# Patient Record
Sex: MALE
[De-identification: names, ages, dates, MRNs, and addresses within clinical notes are randomized per-mention and may not be internally consistent; named-entity substitution may affect disease eponyms.]

---

## 2017-10-17 PROBLEM — Z00.00 ENCOUNTER FOR PREVENTIVE HEALTH EXAMINATION: Status: ACTIVE | Noted: 2017-10-17

## 2017-11-15 ENCOUNTER — APPOINTMENT (OUTPATIENT)
Dept: PULMONOLOGY | Facility: CLINIC | Age: 62
End: 2017-11-15
Payer: MEDICARE

## 2017-11-15 VITALS
HEIGHT: 68 IN | DIASTOLIC BLOOD PRESSURE: 70 MMHG | HEART RATE: 84 BPM | BODY MASS INDEX: 25.91 KG/M2 | SYSTOLIC BLOOD PRESSURE: 110 MMHG | OXYGEN SATURATION: 97 % | WEIGHT: 171 LBS

## 2017-11-15 DIAGNOSIS — Z86.59 PERSONAL HISTORY OF OTHER MENTAL AND BEHAVIORAL DISORDERS: ICD-10-CM

## 2017-11-15 DIAGNOSIS — Z00.00 ENCOUNTER FOR GENERAL ADULT MEDICAL EXAMINATION W/OUT ABNORMAL FINDINGS: ICD-10-CM

## 2017-11-15 DIAGNOSIS — Z87.828 PERSONAL HISTORY OF OTHER (HEALED) PHYSICAL INJURY AND TRAUMA: ICD-10-CM

## 2017-11-15 DIAGNOSIS — Z87.19 PERSONAL HISTORY OF OTHER DISEASES OF THE DIGESTIVE SYSTEM: ICD-10-CM

## 2017-11-15 PROCEDURE — 94010 BREATHING CAPACITY TEST: CPT

## 2017-11-15 PROCEDURE — 99204 OFFICE O/P NEW MOD 45 MIN: CPT | Mod: 25

## 2017-11-15 RX ORDER — FAMOTIDINE 20 MG/1
20 TABLET, FILM COATED ORAL
Refills: 0 | Status: ACTIVE | COMMUNITY

## 2017-11-15 RX ORDER — ACETAMINOPHEN 325 MG/1
325 TABLET ORAL
Refills: 0 | Status: ACTIVE | COMMUNITY

## 2017-11-20 ENCOUNTER — FORM ENCOUNTER (OUTPATIENT)
Age: 62
End: 2017-11-20

## 2017-11-21 ENCOUNTER — APPOINTMENT (OUTPATIENT)
Dept: CT IMAGING | Facility: CLINIC | Age: 62
End: 2017-11-21
Payer: MEDICARE

## 2017-11-21 ENCOUNTER — OUTPATIENT (OUTPATIENT)
Dept: OUTPATIENT SERVICES | Facility: HOSPITAL | Age: 62
LOS: 1 days | End: 2017-11-21
Payer: MEDICARE

## 2017-11-21 DIAGNOSIS — F17.200 NICOTINE DEPENDENCE, UNSPECIFIED, UNCOMPLICATED: ICD-10-CM

## 2017-11-21 PROCEDURE — G0297: CPT

## 2017-11-21 PROCEDURE — G0297: CPT | Mod: 26

## 2017-12-06 ENCOUNTER — APPOINTMENT (OUTPATIENT)
Dept: PULMONOLOGY | Facility: CLINIC | Age: 62
End: 2017-12-06
Payer: MEDICARE

## 2017-12-06 VITALS
OXYGEN SATURATION: 97 % | SYSTOLIC BLOOD PRESSURE: 118 MMHG | RESPIRATION RATE: 16 BRPM | BODY MASS INDEX: 26.15 KG/M2 | DIASTOLIC BLOOD PRESSURE: 72 MMHG | WEIGHT: 172 LBS | HEART RATE: 82 BPM

## 2017-12-06 PROCEDURE — 99214 OFFICE O/P EST MOD 30 MIN: CPT

## 2017-12-21 ENCOUNTER — APPOINTMENT (OUTPATIENT)
Dept: PULMONOLOGY | Facility: CLINIC | Age: 62
End: 2017-12-21
Payer: MEDICARE

## 2017-12-22 ENCOUNTER — APPOINTMENT (OUTPATIENT)
Dept: PULMONOLOGY | Facility: CLINIC | Age: 62
End: 2017-12-22
Payer: MEDICARE

## 2017-12-22 VITALS
WEIGHT: 169 LBS | SYSTOLIC BLOOD PRESSURE: 130 MMHG | DIASTOLIC BLOOD PRESSURE: 70 MMHG | TEMPERATURE: 98.7 F | HEART RATE: 88 BPM | RESPIRATION RATE: 14 BRPM | BODY MASS INDEX: 25.61 KG/M2 | HEIGHT: 68 IN | OXYGEN SATURATION: 97 %

## 2017-12-22 VITALS — BODY MASS INDEX: 25.7 KG/M2 | WEIGHT: 169 LBS

## 2017-12-22 PROCEDURE — 94010 BREATHING CAPACITY TEST: CPT

## 2017-12-22 PROCEDURE — 99214 OFFICE O/P EST MOD 30 MIN: CPT | Mod: 25

## 2017-12-22 RX ORDER — GABAPENTIN 600 MG/1
600 TABLET, COATED ORAL
Refills: 0 | Status: ACTIVE | COMMUNITY

## 2017-12-22 RX ORDER — RISPERIDONE 4 MG/1
4 TABLET, FILM COATED ORAL
Refills: 0 | Status: ACTIVE | COMMUNITY

## 2018-05-18 ENCOUNTER — APPOINTMENT (OUTPATIENT)
Dept: PULMONOLOGY | Facility: CLINIC | Age: 63
End: 2018-05-18
Payer: MEDICARE

## 2018-05-18 VITALS — BODY MASS INDEX: 26 KG/M2 | WEIGHT: 171 LBS

## 2018-05-18 VITALS
SYSTOLIC BLOOD PRESSURE: 110 MMHG | BODY MASS INDEX: 26 KG/M2 | OXYGEN SATURATION: 97 % | HEIGHT: 68 IN | DIASTOLIC BLOOD PRESSURE: 70 MMHG | HEART RATE: 73 BPM

## 2018-05-18 DIAGNOSIS — Z01.811 ENCOUNTER FOR PREPROCEDURAL RESPIRATORY EXAMINATION: ICD-10-CM

## 2018-05-18 PROCEDURE — 94727 GAS DIL/WSHOT DETER LNG VOL: CPT

## 2018-05-18 PROCEDURE — 85018 HEMOGLOBIN: CPT | Mod: QW

## 2018-05-18 PROCEDURE — 99215 OFFICE O/P EST HI 40 MIN: CPT | Mod: 25

## 2018-05-18 PROCEDURE — 94729 DIFFUSING CAPACITY: CPT

## 2018-05-18 PROCEDURE — 94010 BREATHING CAPACITY TEST: CPT

## 2018-05-18 RX ORDER — PREDNISONE 10 MG/1
10 TABLET ORAL
Qty: 21 | Refills: 0 | Status: DISCONTINUED | COMMUNITY
Start: 2017-12-06 | End: 2018-05-18

## 2018-05-29 ENCOUNTER — MEDICATION RENEWAL (OUTPATIENT)
Age: 63
End: 2018-05-29

## 2018-06-06 ENCOUNTER — APPOINTMENT (OUTPATIENT)
Dept: PULMONOLOGY | Facility: CLINIC | Age: 63
End: 2018-06-06
Payer: MEDICARE

## 2018-06-06 VITALS
HEIGHT: 68 IN | DIASTOLIC BLOOD PRESSURE: 70 MMHG | SYSTOLIC BLOOD PRESSURE: 110 MMHG | WEIGHT: 172 LBS | OXYGEN SATURATION: 98 % | BODY MASS INDEX: 26.07 KG/M2 | HEART RATE: 76 BPM

## 2018-06-06 PROCEDURE — 99214 OFFICE O/P EST MOD 30 MIN: CPT

## 2018-09-12 ENCOUNTER — APPOINTMENT (OUTPATIENT)
Dept: PULMONOLOGY | Facility: CLINIC | Age: 63
End: 2018-09-12

## 2018-09-12 RX ORDER — RISPERIDONE 4 MG/1
4 TABLET, FILM COATED ORAL
Refills: 0 | Status: DISCONTINUED | COMMUNITY
End: 2018-09-12

## 2018-09-17 ENCOUNTER — MED ADMIN CHARGE (OUTPATIENT)
Age: 63
End: 2018-09-17

## 2018-09-17 ENCOUNTER — APPOINTMENT (OUTPATIENT)
Dept: PULMONOLOGY | Facility: CLINIC | Age: 63
End: 2018-09-17
Payer: MEDICARE

## 2018-09-17 VITALS
OXYGEN SATURATION: 98 % | WEIGHT: 172 LBS | BODY MASS INDEX: 26.15 KG/M2 | HEART RATE: 69 BPM | SYSTOLIC BLOOD PRESSURE: 110 MMHG | DIASTOLIC BLOOD PRESSURE: 66 MMHG

## 2018-09-17 DIAGNOSIS — Z85.05 PERSONAL HISTORY OF MALIGNANT NEOPLASM OF LIVER: ICD-10-CM

## 2018-09-17 DIAGNOSIS — Z23 ENCOUNTER FOR IMMUNIZATION: ICD-10-CM

## 2018-09-17 PROCEDURE — 99214 OFFICE O/P EST MOD 30 MIN: CPT | Mod: 25

## 2018-09-17 PROCEDURE — G0008: CPT

## 2018-09-17 PROCEDURE — 90688 IIV4 VACCINE SPLT 0.5 ML IM: CPT

## 2018-09-17 RX ORDER — PALIPERIDONE PALMITATE 234 MG/1.5ML
234 INJECTION INTRAMUSCULAR
Refills: 0 | Status: DISCONTINUED | COMMUNITY
End: 2018-09-17

## 2018-09-17 RX ORDER — GABAPENTIN 600 MG/1
600 TABLET, FILM COATED ORAL
Refills: 0 | Status: DISCONTINUED | COMMUNITY
End: 2018-09-17

## 2018-09-17 RX ORDER — FLUTICASONE PROPIONATE AND SALMETEROL XINAFOATE 115; 21 UG/1; UG/1
115-21 AEROSOL, METERED RESPIRATORY (INHALATION)
Qty: 3 | Refills: 3 | Status: DISCONTINUED | COMMUNITY
End: 2018-09-17

## 2019-01-22 ENCOUNTER — APPOINTMENT (OUTPATIENT)
Dept: PULMONOLOGY | Facility: CLINIC | Age: 64
End: 2019-01-22
Payer: MEDICARE

## 2019-01-22 VITALS
SYSTOLIC BLOOD PRESSURE: 118 MMHG | OXYGEN SATURATION: 98 % | BODY MASS INDEX: 27.83 KG/M2 | DIASTOLIC BLOOD PRESSURE: 78 MMHG | HEART RATE: 62 BPM | WEIGHT: 183 LBS | RESPIRATION RATE: 14 BRPM

## 2019-01-22 DIAGNOSIS — R59.0 LOCALIZED ENLARGED LYMPH NODES: ICD-10-CM

## 2019-01-22 DIAGNOSIS — R06.2 WHEEZING: ICD-10-CM

## 2019-01-22 PROCEDURE — 99214 OFFICE O/P EST MOD 30 MIN: CPT

## 2019-01-22 NOTE — CONSULT LETTER
[Dear  ___] : Dear  [unfilled], [Courtesy Letter:] : I had the pleasure of seeing your patient, [unfilled], in my office today. [Consult Closing:] : Thank you very much for allowing me to participate in the care of this patient.  If you have any questions, please do not hesitate to contact me. [DrPilar  ___] : Dr. CEBALLOS [___] : [unfilled] [Leonidas Rossi MD] : Leonidas Rossi MD

## 2019-01-22 NOTE — HISTORY OF PRESENT ILLNESS
[FreeTextEntry1] : Hx COPD. Abnormal CT chest. \par \par Here with .\par \par Has liver cancer. On chemo; sees an oncologist and GI. \par \par He says his breathing is better. Less wheezing. No sob. Cough when smoking. Using advair. Overall, at baseline. \par \par He continues to smoke. \par \par F/U ct chest done 10/26/18; reviewed with his  at the time.

## 2019-01-22 NOTE — PHYSICAL EXAM
[General Appearance - In No Acute Distress] : no acute distress [Normal Conjunctiva] : the conjunctiva exhibited no abnormalities [Normal Oropharynx] : normal oropharynx [Neck Appearance] : the appearance of the neck was normal [] : the neck was supple [Heart Rate And Rhythm] : heart rate and rhythm were normal [Heart Sounds] : normal S1 and S2 [Bowel Sounds] : normal bowel sounds [Abdomen Soft] : soft [Abnormal Walk] : normal gait [Nail Clubbing] : no clubbing of the fingernails [Cyanosis, Localized] : no localized cyanosis [Cranial Nerves] : cranial nerves 2-12 were intact [No Focal Deficits] : no focal deficits [Oriented To Time, Place, And Person] : oriented to person, place, and time [Normal Rate] : the respiratory rate was normal [Rate ___] : at [unfilled] breaths per minute [Clear Bilaterally] : the lungs were clear to auscultation bilaterally [Normal Breath Sounds] : normal bilateral breath sounds [Prolonged Exp Time] : expiratory time was prolonged

## 2019-03-18 ENCOUNTER — APPOINTMENT (OUTPATIENT)
Dept: PULMONOLOGY | Facility: CLINIC | Age: 64
End: 2019-03-18
Payer: MEDICARE

## 2019-03-18 VITALS
WEIGHT: 174 LBS | OXYGEN SATURATION: 96 % | DIASTOLIC BLOOD PRESSURE: 84 MMHG | HEART RATE: 88 BPM | SYSTOLIC BLOOD PRESSURE: 110 MMHG | BODY MASS INDEX: 26.46 KG/M2

## 2019-03-18 PROCEDURE — 99214 OFFICE O/P EST MOD 30 MIN: CPT

## 2019-08-14 ENCOUNTER — APPOINTMENT (OUTPATIENT)
Dept: PULMONOLOGY | Facility: CLINIC | Age: 64
End: 2019-08-14
Payer: MEDICARE

## 2019-08-14 VITALS
SYSTOLIC BLOOD PRESSURE: 120 MMHG | OXYGEN SATURATION: 96 % | DIASTOLIC BLOOD PRESSURE: 60 MMHG | HEIGHT: 68 IN | HEART RATE: 112 BPM | BODY MASS INDEX: 22.81 KG/M2

## 2019-08-14 VITALS — BODY MASS INDEX: 22.73 KG/M2 | HEIGHT: 68 IN | WEIGHT: 150 LBS

## 2019-08-14 PROCEDURE — 94664 DEMO&/EVAL PT USE INHALER: CPT | Mod: 59

## 2019-08-14 PROCEDURE — 99214 OFFICE O/P EST MOD 30 MIN: CPT | Mod: 25

## 2019-08-14 PROCEDURE — 85018 HEMOGLOBIN: CPT | Mod: QW

## 2019-08-14 PROCEDURE — 94060 EVALUATION OF WHEEZING: CPT

## 2019-08-14 PROCEDURE — 94727 GAS DIL/WSHOT DETER LNG VOL: CPT

## 2019-08-14 PROCEDURE — 94729 DIFFUSING CAPACITY: CPT

## 2019-08-14 RX ORDER — MULTIVIT-MIN/FOLIC/VIT K/LYCOP 400-300MCG
50 MCG TABLET ORAL
Refills: 0 | Status: DISCONTINUED | COMMUNITY
End: 2019-08-14

## 2019-08-14 RX ORDER — ASPIRIN 81 MG
81 TABLET, DELAYED RELEASE (ENTERIC COATED) ORAL
Refills: 0 | Status: DISCONTINUED | COMMUNITY
End: 2019-08-14

## 2019-08-14 RX ORDER — PYRIDOXINE HCL (VITAMIN B6) 50 MG
50 TABLET ORAL
Refills: 0 | Status: DISCONTINUED | COMMUNITY
End: 2019-08-14

## 2019-08-14 RX ORDER — FOLIC ACID 1 MG/1
1 TABLET ORAL
Refills: 0 | Status: ACTIVE | COMMUNITY

## 2019-08-14 RX ORDER — WARFARIN 5 MG/1
5 TABLET ORAL
Qty: 7 | Refills: 0 | Status: ACTIVE | COMMUNITY
Start: 2019-07-16

## 2019-08-14 RX ORDER — DIPHENHYDRAMINE HYDROCHLORIDE 25 MG/1
25 CAPSULE ORAL
Qty: 30 | Refills: 0 | Status: DISCONTINUED | COMMUNITY
Start: 2019-02-28 | End: 2019-08-14

## 2019-08-14 RX ORDER — DICYCLOMINE HYDROCHLORIDE 20.6 MG/1
20 TABLET ORAL
Refills: 0 | Status: ACTIVE | COMMUNITY

## 2019-08-14 RX ORDER — MULTIVITAMIN,THER AND MINERALS
TABLET ORAL
Qty: 30 | Refills: 0 | Status: DISCONTINUED | COMMUNITY
Start: 2019-07-17 | End: 2019-08-14

## 2019-08-14 RX ORDER — IBUPROFEN 600 MG/1
600 TABLET, FILM COATED ORAL
Refills: 0 | Status: DISCONTINUED | COMMUNITY
End: 2019-08-14

## 2019-08-14 RX ORDER — PALIPERIDONE PALMITATE 156 MG/ML
156 INJECTION INTRAMUSCULAR
Refills: 0 | Status: DISCONTINUED | COMMUNITY
End: 2019-08-14

## 2019-08-14 RX ORDER — LOPERAMIDE HCL 2 MG
CAPSULE ORAL
Refills: 0 | Status: DISCONTINUED | COMMUNITY
End: 2019-08-14

## 2019-08-14 RX ORDER — MELATONIN 3 MG
3 CAPSULE ORAL
Refills: 0 | Status: ACTIVE | COMMUNITY

## 2019-08-14 RX ORDER — PREDNISONE 5 MG/1
5 TABLET ORAL
Qty: 70 | Refills: 0 | Status: DISCONTINUED | COMMUNITY
Start: 2019-05-04 | End: 2019-08-14

## 2019-08-14 RX ORDER — FLUTICASONE PROPIONATE AND SALMETEROL 250; 50 UG/1; UG/1
250-50 POWDER RESPIRATORY (INHALATION)
Qty: 3 | Refills: 3 | Status: ACTIVE | COMMUNITY
Start: 2017-12-22 | End: 1900-01-01

## 2019-08-14 RX ORDER — ASPIRIN 81 MG/1
81 TABLET ORAL
Qty: 30 | Refills: 0 | Status: DISCONTINUED | COMMUNITY
Start: 2019-01-31 | End: 2019-08-14

## 2019-12-06 ENCOUNTER — APPOINTMENT (OUTPATIENT)
Dept: PULMONOLOGY | Facility: CLINIC | Age: 64
End: 2019-12-06
Payer: MEDICARE

## 2019-12-06 VITALS
SYSTOLIC BLOOD PRESSURE: 112 MMHG | DIASTOLIC BLOOD PRESSURE: 74 MMHG | OXYGEN SATURATION: 97 % | BODY MASS INDEX: 25.01 KG/M2 | HEIGHT: 68 IN | WEIGHT: 165 LBS | HEART RATE: 67 BPM

## 2019-12-06 DIAGNOSIS — F17.200 NICOTINE DEPENDENCE, UNSPECIFIED, UNCOMPLICATED: ICD-10-CM

## 2019-12-06 DIAGNOSIS — R05 COUGH: ICD-10-CM

## 2019-12-06 PROCEDURE — 99214 OFFICE O/P EST MOD 30 MIN: CPT | Mod: 25

## 2019-12-06 PROCEDURE — 94010 BREATHING CAPACITY TEST: CPT

## 2019-12-06 RX ORDER — ALBUTEROL SULFATE 90 UG/1
108 (90 BASE) AEROSOL, METERED RESPIRATORY (INHALATION)
Qty: 3 | Refills: 3 | Status: ACTIVE | COMMUNITY

## 2020-04-07 ENCOUNTER — APPOINTMENT (OUTPATIENT)
Dept: PULMONOLOGY | Facility: CLINIC | Age: 65
End: 2020-04-07

## 2020-06-03 ENCOUNTER — APPOINTMENT (OUTPATIENT)
Dept: PULMONOLOGY | Facility: CLINIC | Age: 65
End: 2020-06-03
Payer: MEDICARE

## 2020-06-03 VITALS
WEIGHT: 171 LBS | OXYGEN SATURATION: 96 % | DIASTOLIC BLOOD PRESSURE: 80 MMHG | HEART RATE: 71 BPM | BODY MASS INDEX: 26 KG/M2 | SYSTOLIC BLOOD PRESSURE: 120 MMHG

## 2020-06-03 DIAGNOSIS — J84.115 RESPIRATORY BRONCHIOLITIS INTERSTITIAL LUNG DISEASE: ICD-10-CM

## 2020-06-03 DIAGNOSIS — R91.8 OTHER NONSPECIFIC ABNORMAL FINDING OF LUNG FIELD: ICD-10-CM

## 2020-06-03 DIAGNOSIS — F17.200 NICOTINE DEPENDENCE, UNSPECIFIED, UNCOMPLICATED: ICD-10-CM

## 2020-06-03 DIAGNOSIS — J98.4 OTHER DISORDERS OF LUNG: ICD-10-CM

## 2020-06-03 DIAGNOSIS — J44.9 CHRONIC OBSTRUCTIVE PULMONARY DISEASE, UNSPECIFIED: ICD-10-CM

## 2020-06-03 PROCEDURE — 99406 BEHAV CHNG SMOKING 3-10 MIN: CPT

## 2020-06-03 PROCEDURE — 99214 OFFICE O/P EST MOD 30 MIN: CPT

## 2021-10-06 PROBLEM — J44.9 OBSTRUCTIVE LUNG DISEASE: Status: ACTIVE | Noted: 2019-08-14
